# Patient Record
Sex: FEMALE | Race: WHITE | URBAN - METROPOLITAN AREA
[De-identification: names, ages, dates, MRNs, and addresses within clinical notes are randomized per-mention and may not be internally consistent; named-entity substitution may affect disease eponyms.]

---

## 2017-08-25 ENCOUNTER — OFFICE VISIT (OUTPATIENT)
Dept: FAMILY MEDICINE | Facility: CLINIC | Age: 62
End: 2017-08-25
Payer: COMMERCIAL

## 2017-08-25 VITALS
SYSTOLIC BLOOD PRESSURE: 120 MMHG | WEIGHT: 150 LBS | DIASTOLIC BLOOD PRESSURE: 86 MMHG | HEIGHT: 62 IN | BODY MASS INDEX: 27.6 KG/M2 | HEART RATE: 76 BPM | TEMPERATURE: 97.4 F

## 2017-08-25 DIAGNOSIS — S01.01XD LACERATION OF SCALP, SUBSEQUENT ENCOUNTER: Primary | ICD-10-CM

## 2017-08-25 PROCEDURE — 99212 OFFICE O/P EST SF 10 MIN: CPT | Performed by: NURSE PRACTITIONER

## 2017-08-25 RX ORDER — NORTRIPTYLINE HYDROCHLORIDE 50 MG/1
CAPSULE ORAL
COMMUNITY
Start: 2017-06-13

## 2017-08-25 RX ORDER — HYDROCODONE BITARTRATE AND ACETAMINOPHEN 5; 325 MG/1; MG/1
TABLET ORAL
COMMUNITY
Start: 2017-03-11

## 2017-08-25 NOTE — NURSING NOTE
"Chief Complaint   Patient presents with     Head Laceration     staple removal        Initial /86  Pulse 76  Temp 97.4  F (36.3  C) (Tympanic)  Ht 5' 1.75\" (1.568 m)  Wt 150 lb (68 kg)  BMI 27.66 kg/m2 Estimated body mass index is 27.66 kg/(m^2) as calculated from the following:    Height as of this encounter: 5' 1.75\" (1.568 m).    Weight as of this encounter: 150 lb (68 kg).  Medication Reconciliation: complete    Health Maintenance that is potentially due pending provider review:  Pt is from [place]     n/a    Is there anyone who you would like to be able to receive your results? No  If yes have patient fill out KRIS    Jaky Bowman CMA    "

## 2017-08-25 NOTE — MR AVS SNAPSHOT
"              After Visit Summary   2017    David Crespo    MRN: [id number]           Patient Information     Date Of Birth          1955        Visit Information        Provider Department      2017 1:20 PM Ana Pinedo APRN CNP [facility]        Today's Diagnoses     Laceration of scalp, subsequent encounter    -  1       Follow-ups after your visit        Who to contact     If you have questions or need follow up information about today's clinic visit or your schedule please contact [facility] directly at [phone].  Normal or non-critical lab and imaging results will be communicated to you by [employer], letter or phone within 4 business days after the clinic has received the results. If you do not hear from us within 7 days, please contact the clinic through [employer] or phone. If you have a critical or abnormal lab result, we will notify you by phone as soon as possible.  Submit refill requests through [employer] or call your pharmacy and they will forward the refill request to us. Please allow 3 business days for your refill to be completed.          Additional Information About Your Visit        MyChart Information     [employer] lets you send messages to your doctor, view your test results, renew your prescriptions, schedule appointments and more. To sign up, go to [email or web address] . Click on \"Log in\" on the left side of the screen, which will take you to the Welcome page. Then click on \"Sign up Now\" on the right side of the page.     You will be asked to enter the access code listed below, as well as some personal information. Please follow the directions to create your username and password.     Your access code is: [email or web address]  Expires: 2017  1:53 PM     Your access code will  in 90 days. If you need help or a new code, please call your [facility] or [phone].        Care EveryWhere ID     This is your Care EveryWhere ID. " "This could be used by other organizations to access your [place] medical records  [id number]        Your Vitals Were     Pulse Temperature Height BMI (Body Mass Index)          76 97.4  F (36.3  C) (Tympanic) 5' 1.75\" (1.568 m) 27.66 kg/m2         Blood Pressure from Last 3 Encounters:   08/25/17 120/86   05/17/12 116/68   05/09/12 125/78    Weight from Last 3 Encounters:   08/25/17 150 lb (68 kg)   05/17/12 163 lb (73.9 kg)   05/09/12 164 lb (74.4 kg)              We Performed the Following     SUTURE REMOVAL TRAY          Today's Medication Changes          These changes are accurate as of: 8/25/17  1:53 PM.  If you have any questions, ask your nurse or doctor.               Stop taking these medicines if you haven't already. Please contact your care team if you have questions.     albuterol 108 (90 BASE) MCG/ACT Inhaler   Commonly known as:  PROAIR HFA/PROVENTIL HFA/VENTOLIN HFA   Stopped by:  Ana Pinedo APRN CNP           cyanocolbalamin 500 MCG tablet   Commonly known as:  vitamin  B-12   Stopped by:  Ana Pinedo APRN CNP           FISH OIL PO   Stopped by:  Ana Pinedo APRN CNP           guaiFENesin-dextromethorphan 100-10 MG/5ML syrup   Commonly known as:  ROBITUSSIN DM   Stopped by:  Ana Pinedo APRN CNP           MAGNESIA PO   Stopped by:  Ana Pinedo APRN CNP           study - Gatorade 200 ML Soln   Commonly known as:  IDS 3730   Stopped by:  Ana Pinedo APRN CNP                    Primary Care Provider Office Phone # Fax #    Link Monty Melissa [phone]       XXX NO INFO FOUND XXX XXX  XXX [place] [address]        Equal Access to Services     [facility] : Bryant barroso Somumtaz, waaxda luqadaha, qaybta kaalkarin mendez, jonathon begum . [facility] [phone].    ATENCIÓN: Si habla español, tiene a adkins disposición servicios gratuitos de asistencia lingüística. Llame al [phone].    We comply " with applicable federal civil rights laws and [place] laws. We do not discriminate on the basis of race, color, national origin, age, disability sex, sexual orientation or gender identity.            Thank you!     Thank you for choosing [facility]  for your care. Our goal is always to provide you with excellent care. Hearing back from our patients is one way we can continue to improve our services. Please take a few minutes to complete the written survey that you may receive in the mail after your visit with us. Thank you!             Your Updated Medication List - Protect others around you: Learn how to safely use, store and throw away your medicines at www.disposemymeds.org.          This list is accurate as of: 8/25/17  1:53 PM.  Always use your most recent med list.                   Brand Name Dispense Instructions for use Diagnosis    doxepin 100 MG capsule    SINEquan    60 capsule    Take 2 capsules by mouth At Bedtime. NEEDS APPT PRIOR TO FURTHER REFILL    Mild major depression (H)       FLUoxetine 40 MG capsule    PROzac    90 capsule    Take 1 capsule by mouth daily. DUE FOR APPT IN MAY    Mild major depression (H)       HYDROcodone-acetaminophen 5-325 MG per tablet    NORCO          Multiple vitamin  s Tabs      Take 1 tablet by mouth daily.        nortriptyline 50 MG capsule    PAMELOR

## 2017-08-25 NOTE — PROGRESS NOTES
SUBJECTIVE:   David Crespo is a 62 year old female who presents to clinic today for the following health issues:      ED/UC Followup:    Facility:  [facility]   Date of visit: 8/17/2017  Reason for visit: Fall - head laceration   Current Status: Pt needs staples removed from head. Pt states she is feeling okay, reports no pain        David presents to the clinic today for  removal of staples.  The patient has had the staples in place for 8 days.    There has been no history of infection or drainage.  Fell down one stair striking head on railing after consuming 6-7 beers  CT head and spine x ray unremarkable in ED    Tetanus status is up to date.    Problem list and histories reviewed & adjusted, as indicated.  Additional history: as documented    Patient Active Problem List   Diagnosis     CARDIOVASCULAR SCREENING; LDL GOAL LESS THAN 130     Mild major depression (H)     Alcoholism (H)     Smoking     Gastric bypass status for obesity about 4 years ago     Past Surgical History:   Procedure Laterality Date     BACK SURGERY      cervical disc bulge, chip in lower back causing sciatica     CHOLECYSTECTOMY       HYSTERECTOMY, PAP NO LONGER INDICATED       obesity      gastric bypass       Social History   Substance Use Topics     Smoking status: Current Every Day Smoker     Packs/day: 1.00     Years: 40.00     Types: Cigarettes     Smokeless tobacco: Never Used     Alcohol use Yes     History reviewed. No pertinent family history.      Current Outpatient Prescriptions   Medication Sig Dispense Refill     HYDROcodone-acetaminophen (NORCO) 5-325 MG per tablet        DoxePIN (SINEQUAN) 100 MG capsule Take 2 capsules by mouth At Bedtime. NEEDS APPT PRIOR TO FURTHER REFILL 60 capsule 0     FLUoxetine (PROZAC) 40 MG capsule Take 1 capsule by mouth daily. DUE FOR APPT IN MAY 90 capsule 0     Multiple Vitamins TABS Take 1 tablet by mouth daily.       nortriptyline (PAMELOR) 50 MG capsule        Allergies   Allergen Reactions  "    Codeine Sulfate      Labs reviewed in EPIC      Reviewed and updated as needed this visit by clinical staff  Tobacco  Allergies  Meds  Med Hx  Surg Hx  Fam Hx  Soc Hx      Reviewed and updated as needed this visit by Provider       ROS:  Constitutional, HEENT, cardiovascular, pulmonary, gi and gu systems are negative, except as otherwise noted.      OBJECTIVE:   /86  Pulse 76  Temp 97.4  F (36.3  C) (Tympanic)  Ht 5' 1.75\" (1.568 m)  Wt 150 lb (68 kg)  BMI 27.66 kg/m2  Body mass index is 27.66 kg/(m^2).  GENERAL: healthy, alert and no distress  SKIN: healing laceration with 6 staples present left mid scalp  PSYCH: mentation appears normal, affect normal/bright    Diagnostic Test Results:  none     ASSESSMENT/PLAN:     1. Laceration of scalp, subsequent encounter  The wound is healing well with no signs of infection.  After consent was obtained, all 6 staples were easily removed today.  Routine wound care discussed.  The patient will follow up as needed.  - SUTURE REMOVAL TRAY    Home care instructions were reviewed with the patient. The risks, benefits and treatment options of prescribed medications or other treatments have been discussed with the patient. The patient verbalized their understanding and should call or follow up if no improvement or if they develop further problems.      [facility]    "

## 2018-03-14 ENCOUNTER — TELEPHONE (OUTPATIENT)
Dept: FAMILY MEDICINE | Facility: CLINIC | Age: 63
End: 2018-03-14

## 2018-03-14 NOTE — TELEPHONE ENCOUNTER
3/14/2018    Attempt 1    Contacted patient in regards to scheduling VIP mammogram  Message on voicemail     Patient is also due for - Preventive Health Screening Colonoscopy    Comments:       Outreach   AT

## 2018-03-21 NOTE — TELEPHONE ENCOUNTER
3/21/2018     Attempt 2     Contacted patient in regards to scheduling VIP mammogram  Message on voicemail      Patient is also due for - Preventive Health Screening Colonoscopy     Comments:         Outreach   AT

## 2021-05-26 ENCOUNTER — RECORDS - HEALTHEAST (OUTPATIENT)
Dept: ADMINISTRATIVE | Facility: CLINIC | Age: 66
End: 2021-05-26

## 2021-05-27 ENCOUNTER — RECORDS - HEALTHEAST (OUTPATIENT)
Dept: ADMINISTRATIVE | Facility: CLINIC | Age: 66
End: 2021-05-27

## 2021-05-28 ENCOUNTER — RECORDS - HEALTHEAST (OUTPATIENT)
Dept: ADMINISTRATIVE | Facility: CLINIC | Age: 66
End: 2021-05-28

## 2021-07-13 ENCOUNTER — RECORDS - HEALTHEAST (OUTPATIENT)
Dept: ADMINISTRATIVE | Facility: CLINIC | Age: 66
End: 2021-07-13

## 2021-07-21 ENCOUNTER — RECORDS - HEALTHEAST (OUTPATIENT)
Dept: ADMINISTRATIVE | Facility: CLINIC | Age: 66
End: 2021-07-21